# Patient Record
Sex: FEMALE | Race: ASIAN | NOT HISPANIC OR LATINO | Employment: FULL TIME | URBAN - METROPOLITAN AREA
[De-identification: names, ages, dates, MRNs, and addresses within clinical notes are randomized per-mention and may not be internally consistent; named-entity substitution may affect disease eponyms.]

---

## 2024-05-19 ENCOUNTER — APPOINTMENT (OUTPATIENT)
Dept: GENERAL RADIOLOGY | Facility: HOSPITAL | Age: 64
End: 2024-05-19
Payer: COMMERCIAL

## 2024-05-19 ENCOUNTER — HOSPITAL ENCOUNTER (EMERGENCY)
Facility: HOSPITAL | Age: 64
Discharge: HOME | End: 2024-05-20
Attending: EMERGENCY MEDICINE | Admitting: EMERGENCY MEDICINE
Payer: COMMERCIAL

## 2024-05-19 DIAGNOSIS — M25.562 PAIN AND SWELLING OF KNEE, LEFT: ICD-10-CM

## 2024-05-19 DIAGNOSIS — M25.462 PAIN AND SWELLING OF KNEE, LEFT: ICD-10-CM

## 2024-05-19 DIAGNOSIS — R21 RASH AND OTHER NONSPECIFIC SKIN ERUPTION: Primary | ICD-10-CM

## 2024-05-19 LAB
ALBUMIN SERPL-MCNC: 4.4 G/DL (ref 3.5–5.2)
ALBUMIN/GLOB SERPL: 1.4 G/DL
ALP SERPL-CCNC: 87 U/L (ref 39–117)
ALT SERPL W P-5'-P-CCNC: 6 U/L (ref 1–33)
ANION GAP SERPL CALCULATED.3IONS-SCNC: 11.3 MMOL/L (ref 5–15)
AST SERPL-CCNC: 20 U/L (ref 1–32)
BASOPHILS # BLD AUTO: 0.03 10*3/MM3 (ref 0–0.2)
BASOPHILS NFR BLD AUTO: 0.6 % (ref 0–1.5)
BILIRUB SERPL-MCNC: 0.4 MG/DL (ref 0–1.2)
BUN SERPL-MCNC: 9 MG/DL (ref 8–23)
BUN/CREAT SERPL: 14.1 (ref 7–25)
CALCIUM SPEC-SCNC: 9.1 MG/DL (ref 8.6–10.5)
CHLORIDE SERPL-SCNC: 100 MMOL/L (ref 98–107)
CO2 SERPL-SCNC: 28.7 MMOL/L (ref 22–29)
CREAT SERPL-MCNC: 0.64 MG/DL (ref 0.57–1)
CRP SERPL-MCNC: <0.3 MG/DL (ref 0–0.5)
DEPRECATED RDW RBC AUTO: 40.8 FL (ref 37–54)
EGFRCR SERPLBLD CKD-EPI 2021: 100.1 ML/MIN/1.73
EOSINOPHIL # BLD AUTO: 0.11 10*3/MM3 (ref 0–0.4)
EOSINOPHIL NFR BLD AUTO: 2.1 % (ref 0.3–6.2)
ERYTHROCYTE [DISTWIDTH] IN BLOOD BY AUTOMATED COUNT: 12.4 % (ref 12.3–15.4)
ERYTHROCYTE [SEDIMENTATION RATE] IN BLOOD: 16 MM/HR (ref 0–30)
GLOBULIN UR ELPH-MCNC: 3.1 GM/DL
GLUCOSE SERPL-MCNC: 124 MG/DL (ref 65–99)
HCT VFR BLD AUTO: 39.5 % (ref 34–46.6)
HGB BLD-MCNC: 13.1 G/DL (ref 12–15.9)
IMM GRANULOCYTES # BLD AUTO: 0.01 10*3/MM3 (ref 0–0.05)
IMM GRANULOCYTES NFR BLD AUTO: 0.2 % (ref 0–0.5)
LYMPHOCYTES # BLD AUTO: 1.42 10*3/MM3 (ref 0.7–3.1)
LYMPHOCYTES NFR BLD AUTO: 26.9 % (ref 19.6–45.3)
MCH RBC QN AUTO: 29.8 PG (ref 26.6–33)
MCHC RBC AUTO-ENTMCNC: 33.2 G/DL (ref 31.5–35.7)
MCV RBC AUTO: 90 FL (ref 79–97)
MONOCYTES # BLD AUTO: 0.38 10*3/MM3 (ref 0.1–0.9)
MONOCYTES NFR BLD AUTO: 7.2 % (ref 5–12)
NEUTROPHILS NFR BLD AUTO: 3.32 10*3/MM3 (ref 1.7–7)
NEUTROPHILS NFR BLD AUTO: 63 % (ref 42.7–76)
NRBC BLD AUTO-RTO: 0 /100 WBC (ref 0–0.2)
PLATELET # BLD AUTO: 256 10*3/MM3 (ref 140–450)
PMV BLD AUTO: 9 FL (ref 6–12)
POTASSIUM SERPL-SCNC: 3.3 MMOL/L (ref 3.5–5.2)
PROT SERPL-MCNC: 7.5 G/DL (ref 6–8.5)
RBC # BLD AUTO: 4.39 10*6/MM3 (ref 3.77–5.28)
SODIUM SERPL-SCNC: 140 MMOL/L (ref 136–145)
WBC NRBC COR # BLD AUTO: 5.27 10*3/MM3 (ref 3.4–10.8)

## 2024-05-19 PROCEDURE — 25010000002 DEXAMETHASONE SODIUM PHOSPHATE 10 MG/ML SOLUTION

## 2024-05-19 PROCEDURE — 86618 LYME DISEASE ANTIBODY: CPT

## 2024-05-19 PROCEDURE — 86140 C-REACTIVE PROTEIN: CPT

## 2024-05-19 PROCEDURE — 25010000002 KETOROLAC TROMETHAMINE PER 15 MG

## 2024-05-19 PROCEDURE — 85025 COMPLETE CBC W/AUTO DIFF WBC: CPT

## 2024-05-19 PROCEDURE — 99283 EMERGENCY DEPT VISIT LOW MDM: CPT

## 2024-05-19 PROCEDURE — 96375 TX/PRO/DX INJ NEW DRUG ADDON: CPT

## 2024-05-19 PROCEDURE — 96374 THER/PROPH/DIAG INJ IV PUSH: CPT

## 2024-05-19 PROCEDURE — 87484 EHRLICHA CHAFFEENSIS AMP PRB: CPT

## 2024-05-19 PROCEDURE — 87468 ANAPLSMA PHGCYTOPHLM AMP PRB: CPT

## 2024-05-19 PROCEDURE — 87798 DETECT AGENT NOS DNA AMP: CPT

## 2024-05-19 PROCEDURE — 73562 X-RAY EXAM OF KNEE 3: CPT

## 2024-05-19 PROCEDURE — 85652 RBC SED RATE AUTOMATED: CPT

## 2024-05-19 PROCEDURE — 80053 COMPREHEN METABOLIC PANEL: CPT

## 2024-05-19 RX ORDER — KETOROLAC TROMETHAMINE 30 MG/ML
30 INJECTION, SOLUTION INTRAMUSCULAR; INTRAVENOUS ONCE
Status: COMPLETED | OUTPATIENT
Start: 2024-05-19 | End: 2024-05-19

## 2024-05-19 RX ORDER — FAMOTIDINE 20 MG/1
20 TABLET, FILM COATED ORAL
Status: DISCONTINUED | OUTPATIENT
Start: 2024-05-20 | End: 2024-05-19

## 2024-05-19 RX ORDER — FAMOTIDINE 10 MG/ML
20 INJECTION, SOLUTION INTRAVENOUS ONCE
Status: COMPLETED | OUTPATIENT
Start: 2024-05-19 | End: 2024-05-19

## 2024-05-19 RX ORDER — DEXAMETHASONE SODIUM PHOSPHATE 10 MG/ML
10 INJECTION, SOLUTION INTRAMUSCULAR; INTRAVENOUS ONCE
Status: DISCONTINUED | OUTPATIENT
Start: 2024-05-19 | End: 2024-05-19

## 2024-05-19 RX ORDER — DEXAMETHASONE SODIUM PHOSPHATE 10 MG/ML
10 INJECTION, SOLUTION INTRAMUSCULAR; INTRAVENOUS ONCE
Status: COMPLETED | OUTPATIENT
Start: 2024-05-19 | End: 2024-05-19

## 2024-05-19 RX ADMIN — FAMOTIDINE 20 MG: 10 INJECTION INTRAVENOUS at 23:31

## 2024-05-19 RX ADMIN — DEXAMETHASONE SODIUM PHOSPHATE 10 MG: 10 INJECTION, SOLUTION INTRAMUSCULAR; INTRAVENOUS at 23:31

## 2024-05-19 RX ADMIN — KETOROLAC TROMETHAMINE 30 MG: 30 INJECTION, SOLUTION INTRAMUSCULAR; INTRAVENOUS at 23:31

## 2024-05-19 NOTE — Clinical Note
[facility] EMERGENCY ROOM  [address] [address]  Phone: [phone]  Fax: [phone]    Stevenson Zuleta was seen and treated in our emergency department on 5/19/2024.  She may return to work on 05/22/2024.         Thank you for choosing [facility].    Andre Griffin PA-C

## 2024-05-20 VITALS
RESPIRATION RATE: 18 BRPM | TEMPERATURE: 98.9 F | HEART RATE: 67 BPM | SYSTOLIC BLOOD PRESSURE: 144 MMHG | HEIGHT: 62 IN | OXYGEN SATURATION: 98 % | DIASTOLIC BLOOD PRESSURE: 69 MMHG

## 2024-05-20 RX ORDER — FAMOTIDINE 20 MG/1
20 TABLET, FILM COATED ORAL 2 TIMES DAILY
Qty: 20 TABLET | Refills: 0 | Status: SHIPPED | OUTPATIENT
Start: 2024-05-20

## 2024-05-20 RX ORDER — DOXYCYCLINE 100 MG/1
100 CAPSULE ORAL 2 TIMES DAILY
Qty: 20 CAPSULE | Refills: 0 | Status: SHIPPED | OUTPATIENT
Start: 2024-05-20 | End: 2024-05-30

## 2024-05-20 NOTE — DISCHARGE INSTRUCTIONS
All lab work negative  White blood cell count is not elevated, your inflammatory markers are not elevated.  Your x-ray was negative for for inflammation or fluid  Please take Benadryl at night to make sure drowsy, have sent Pepcid to help as a antihistamine   I have started you on antibiotics.  Please take twice daily for 10 days  Please open your MyChart for your tickborne panel results and follow-up with your PCP.  I have also put in referral for infectious disease.

## 2024-05-20 NOTE — ED PROVIDER NOTES
"Time: 11:10 PM EDT  Date of encounter:  5/19/2024  Independent Historian/Clinical History and Information was obtained by:   Patient    History is limited by: N/A    Chief Complaint   Patient presents with    Rash         History of Present Illness:  Patient is a 63 y.o. year old female who presents to the emergency department for evaluation of rash to bilateral lower extremities from anterior thigh down to foot x 5 days.  Patient states that she was in [place] wearing socks, long pants and boots walking in grass when she got bit by some insects.  Patient states that she did not notice the rash until 3 days after.  Patient also has complaints of left knee pain, swelling and erythema that started today and states that she was bit by an insect there as well for.  Denies fall or injury.  States the rash itches.  Not able to take Benadryl due to being allergic to it.     Patient Care Team  Primary Care Provider: Provider, No Known    Past Medical History:     No Known Allergies  History reviewed. No pertinent past medical history.  No past surgical history on file.  History reviewed. No pertinent family history.    Home Medications:  Prior to Admission medications    Not on File        Social History:          Review of Systems:  Review of Systems   Constitutional: Negative.    HENT: Negative.     Eyes: Negative.    Respiratory: Negative.     Cardiovascular: Negative.    Gastrointestinal: Negative.    Endocrine: Negative.    Genitourinary: Negative.    Musculoskeletal:  Positive for arthralgias and joint swelling.   Skin:  Positive for color change and wound.   Allergic/Immunologic: Negative.    Neurological: Negative.    Hematological: Negative.    Psychiatric/Behavioral: Negative.          Physical Exam:  /69 (BP Location: Right arm, Patient Position: Sitting)   Pulse 72   Temp 98.9 °F (37.2 °C) (Oral)   Resp 18   Ht 157.5 cm (62\")   SpO2 97%         Physical Exam  Vitals and nursing note reviewed. "   Constitutional:       Appearance: Normal appearance.   HENT:      Head: Normocephalic and atraumatic.      Nose: Nose normal.      Mouth/Throat:      Mouth: Mucous membranes are moist.   Eyes:      Extraocular Movements: Extraocular movements intact.      Conjunctiva/sclera: Conjunctivae normal.      Pupils: Pupils are equal, round, and reactive to light.   Cardiovascular:      Rate and Rhythm: Normal rate and regular rhythm.      Heart sounds: Normal heart sounds.   Pulmonary:      Effort: Pulmonary effort is normal.      Breath sounds: Normal breath sounds.   Musculoskeletal:         General: Swelling and tenderness present. Normal range of motion.      Cervical back: Normal range of motion and neck supple.      Left knee: Swelling and erythema present. Normal range of motion. Tenderness present.        Legs:    Skin:     General: Skin is warm and dry.      Findings: Erythema and rash present. Rash is macular and papular. Rash is not pustular or urticarial.      Comments: There is several areas of nonblanchable erythematous less than 1 cm in length and greater than 1 cm circular rash noted from the upper leg worsening towards the lower legs.  Rash at the lower extremities are raised.     See pictures attached   Neurological:      General: No focal deficit present.      Mental Status: She is alert and oriented to person, place, and time.   Psychiatric:         Mood and Affect: Mood normal.         Behavior: Behavior normal.                                Procedures:  Procedures      Medical Decision Making:      Comorbidities that affect care:    None    External Notes reviewed:    None      The following orders were placed and all results were independently analyzed by me:  Orders Placed This Encounter   Procedures    XR Knee 3 View Left    Comprehensive Metabolic Panel    Sedimentation Rate    C-reactive Protein    Ehrlichia Profile DNA PCR    Rickettsia Species DNA, Real-Time PCR    Lyme Disease Total Antibody  With Reflex to Immunoassay    CBC Auto Differential    Ambulatory Referral to Infectious Disease    CBC & Differential       Medications Given in the Emergency Department:  Medications   ketorolac (TORADOL) injection 30 mg (30 mg Intravenous Given 5/19/24 2331)   dexAMETHasone sodium phosphate injection 10 mg (10 mg Intravenous Given 5/19/24 2331)   famotidine (PEPCID) injection 20 mg (20 mg Intravenous Given 5/19/24 2331)        ED Course:    The patient was initially evaluated in the triage area where orders were placed. The patient was later dispositioned by Andre Griffin PA-C.      The patient was advised to stay for completion of workup which includes but is not limited to communication of labs and radiological results, reassessment and plan. The patient was advised that leaving prior to disposition by a provider could result in critical findings that are not communicated to the patient.     ED Course as of 05/20/24 0017   Sun May 19, 2024   2334 Patient states she is not allergic to Benadryl which is makes her sleepy.  Patient does not have another ride home. [AJ]      ED Course User Index  [AJ] Andre Griffin PA-C       Labs:    Lab Results (last 24 hours)       Procedure Component Value Units Date/Time    CBC & Differential [[id number]]  (Normal) Collected: 05/19/24 2327    Specimen: Blood from Arm, Right Updated: 05/19/24 2333    Narrative:      The following orders were created for panel order CBC & Differential.  Procedure                               Abnormality         Status                     ---------                               -----------         ------                     CBC Auto Differential[[id number]]        Normal              Final result                 Please view results for these tests on the individual orders.    Comprehensive Metabolic Panel [[id number]]  (Abnormal) Collected: 05/19/24 2327    Specimen: Blood from Arm, Right Updated: 05/19/24 2354     Glucose 124 mg/dL       BUN 9 mg/dL      Creatinine 0.64 mg/dL      Sodium 140 mmol/L      Potassium 3.3 mmol/L      Chloride 100 mmol/L      CO2 28.7 mmol/L      Calcium 9.1 mg/dL      Total Protein 7.5 g/dL      Albumin 4.4 g/dL      ALT (SGPT) 6 U/L      AST (SGOT) 20 U/L      Alkaline Phosphatase 87 U/L      Total Bilirubin 0.4 mg/dL      Globulin 3.1 gm/dL      A/G Ratio 1.4 g/dL      BUN/Creatinine Ratio 14.1     Anion Gap 11.3 mmol/L      eGFR 100.1 mL/min/1.73     Narrative:      GFR Normal >60  Chronic Kidney Disease <60  Kidney Failure <15      Sedimentation Rate [[id number]]  (Normal) Collected: 05/19/24 2327    Specimen: Blood from Arm, Right Updated: 05/19/24 2337     Sed Rate 16 mm/hr     C-reactive Protein [[id number]]  (Normal) Collected: 05/19/24 2327    Specimen: Blood from Arm, Right Updated: 05/19/24 2354     C-Reactive Protein <0.30 mg/dL     Ehrlichia Profile DNA PCR [[id number]] Collected: 05/19/24 2327    Specimen: Blood from Arm, Right Updated: 05/19/24 2330    Rickettsia Species DNA, Real-Time PCR [[id number]] Collected: 05/19/24 2327    Specimen: Blood from Arm, Right Updated: 05/19/24 2330    Lyme Disease Total Antibody With Reflex to Immunoassay [[id number]] Collected: 05/19/24 2327    Specimen: Blood from Arm, Right Updated: 05/19/24 2330    CBC Auto Differential [[id number]]  (Normal) Collected: 05/19/24 2327    Specimen: Blood from Arm, Right Updated: 05/19/24 2333     WBC 5.27 10*3/mm3      RBC 4.39 10*6/mm3      Hemoglobin 13.1 g/dL      Hematocrit 39.5 %      MCV 90.0 fL      MCH 29.8 pg      MCHC 33.2 g/dL      RDW 12.4 %      RDW-SD 40.8 fl      MPV 9.0 fL      Platelets 256 10*3/mm3      Neutrophil % 63.0 %      Lymphocyte % 26.9 %      Monocyte % 7.2 %      Eosinophil % 2.1 %      Basophil % 0.6 %      Immature Grans % 0.2 %      Neutrophils, Absolute 3.32 10*3/mm3      Lymphocytes, Absolute 1.42 10*3/mm3      Monocytes, Absolute 0.38 10*3/mm3      Eosinophils, Absolute 0.11 10*3/mm3      Basophils,  Absolute 0.03 10*3/mm3      Immature Grans, Absolute 0.01 10*3/mm3      nRBC 0.0 /100 WBC              Imaging:    XR Knee 3 View Left    Result Date: 5/20/2024  XR KNEE 3 VW LEFT-  Date of Exam: 5/19/2024 11:33 PM  Indication: Left knee pain/erythema/swelling.  Comparison: None available.  FINDINGS: 3 nonweightbearing views of the left knee were obtained. No acute fracture or acute malalignment is identified. No retained radiopaque foreign body is seen. No joint effusion. The joint spaces are preserved radiographically with minimal, if any, degenerative change. Mild nonspecific soft tissue swelling may be present anteromedially. No ectopic gas collection is suggested. If symptoms or clinical concerns persist, consider imaging follow-up.  CONCLUSION: No acute fracture or acute malalignment is identified.    Please note that portions of this note were completed with a voice recognition program.       Electronically Signed By-Elías Denson MD On:5/20/2024 12:08 AM         Differential Diagnosis and Discussion:      Joint Pain: Differential diagnosis includes but is not limited to polyarticular arthritis, gout, tendinitis, hemarthrosis, septic arthritis, rheumatoid arthritis, bursitis, degenerative joint disease, joint effusion, autoimmune disorder, trauma, and occult neoplasm.  Rash: Differential diagnosis includes but is not limited to sepsis, cellulitis, [place] Spotted Fever, meningitis, meningococcemia, Varicella, Strep infection, dermatitis, allergic reaction, Lyme disease, and toxic shock syndrome.    All labs were reviewed and interpreted by me.  All X-rays impressions were independently interpreted by me.    MDM     Amount and/or Complexity of Data Reviewed  Clinical lab tests: reviewed                 Patient Care Considerations:    SEPSIS was considered but is NOT present in the emergency department as SIRS criteria is not present.      Consultants/Shared Management Plan:    None    Social  Determinants of Health:    Patient is independent, reliable, and has access to care.       Disposition and Care Coordination:    Discharged: The patient is suitable and stable for discharge with no need for consideration of admission.    I have explained the patient´s condition, diagnoses and treatment plan based on the information available to me at this time. I have answered questions and addressed any concerns. The patient has a good  understanding of the patient´s diagnosis, condition, and treatment plan as can be expected at this point. The vital signs have been stable. The patient´s condition is stable and appropriate for discharge from the emergency department.      The patient will pursue further outpatient evaluation with the primary care physician or other designated or consulting physician as outlined in the discharge instructions. They are agreeable to this plan of care and follow-up instructions have been explained in detail. The patient has received these instructions in written format and has expressed an understanding of the discharge instructions. The patient is aware that any significant change in condition or worsening of symptoms should prompt an immediate return to this or the closest emergency department or call to 911.  I have explained discharge medications and the need for follow up with the patient/caretakers. This was also printed in the discharge instructions. Patient was discharged with the following medications and follow up:      Medication List        New Prescriptions      doxycycline 100 MG capsule  Commonly known as: MONODOX  Take 1 capsule by mouth 2 (Two) Times a Day for 10 days.     famotidine 20 MG tablet  Commonly known as: PEPCID  Take 1 tablet by mouth 2 (Two) Times a Day.               Where to Get Your Medications        These medications were sent to [facility]/pharmacy #[id number] - [place], [place] - [address] - [phone]  - [phone] FX  [address]  [place] [address]      Hours: 24-hours Phone: [phone]   doxycycline 100 MG capsule  famotidine 20 MG tablet      Provider, No Known  [facility]  [place] [address]             Final diagnoses:   Rash and other nonspecific skin eruption   Pain and swelling of knee, left        ED Disposition       ED Disposition   Discharge    Condition   Stable    Comment   --               This medical record created using voice recognition software.             Andre Griffin PA-C  05/20/24 0016       Andre Griffin PA-C  05/20/24 0017

## 2024-05-21 LAB — B BURGDOR IGG+IGM SER QL IA: NEGATIVE

## 2024-05-23 LAB
A PHAGOCYTOPH DNA BLD QL NAA+PROBE: NEGATIVE
E CHAFFEENSIS DNA BLD QL NAA+PROBE: NEGATIVE

## 2024-05-24 ENCOUNTER — HOSPITAL ENCOUNTER (EMERGENCY)
Facility: HOSPITAL | Age: 64
Discharge: HOME | End: 2024-05-24
Attending: EMERGENCY MEDICINE
Payer: COMMERCIAL

## 2024-05-24 ENCOUNTER — APPOINTMENT (OUTPATIENT)
Dept: GENERAL RADIOLOGY | Facility: HOSPITAL | Age: 64
End: 2024-05-24
Payer: COMMERCIAL

## 2024-05-24 VITALS
RESPIRATION RATE: 18 BRPM | HEIGHT: 62 IN | WEIGHT: 102.29 LBS | SYSTOLIC BLOOD PRESSURE: 139 MMHG | HEART RATE: 74 BPM | OXYGEN SATURATION: 99 % | DIASTOLIC BLOOD PRESSURE: 63 MMHG | BODY MASS INDEX: 18.82 KG/M2 | TEMPERATURE: 97.9 F

## 2024-05-24 DIAGNOSIS — L24.9 IRRITANT CONTACT DERMATITIS, UNSPECIFIED TRIGGER: Primary | ICD-10-CM

## 2024-05-24 LAB
ALBUMIN SERPL-MCNC: 4.4 G/DL (ref 3.5–5.2)
ALBUMIN/GLOB SERPL: 1.4 G/DL
ALP SERPL-CCNC: 91 U/L (ref 39–117)
ALT SERPL W P-5'-P-CCNC: 13 U/L (ref 1–33)
ANION GAP SERPL CALCULATED.3IONS-SCNC: 9.7 MMOL/L (ref 5–15)
AST SERPL-CCNC: 22 U/L (ref 1–32)
BASOPHILS # BLD AUTO: 0.03 10*3/MM3 (ref 0–0.2)
BASOPHILS NFR BLD AUTO: 0.4 % (ref 0–1.5)
BILIRUB SERPL-MCNC: 0.3 MG/DL (ref 0–1.2)
BUN SERPL-MCNC: 10 MG/DL (ref 8–23)
BUN/CREAT SERPL: 13.9 (ref 7–25)
CALCIUM SPEC-SCNC: 8.9 MG/DL (ref 8.6–10.5)
CHLORIDE SERPL-SCNC: 102 MMOL/L (ref 98–107)
CO2 SERPL-SCNC: 28.3 MMOL/L (ref 22–29)
CREAT SERPL-MCNC: 0.72 MG/DL (ref 0.57–1)
DEPRECATED RDW RBC AUTO: 40.7 FL (ref 37–54)
EGFRCR SERPLBLD CKD-EPI 2021: 94.7 ML/MIN/1.73
EOSINOPHIL # BLD AUTO: 0.14 10*3/MM3 (ref 0–0.4)
EOSINOPHIL NFR BLD AUTO: 1.9 % (ref 0.3–6.2)
ERYTHROCYTE [DISTWIDTH] IN BLOOD BY AUTOMATED COUNT: 12.4 % (ref 12.3–15.4)
GLOBULIN UR ELPH-MCNC: 3.1 GM/DL
GLUCOSE SERPL-MCNC: 85 MG/DL (ref 65–99)
HCT VFR BLD AUTO: 38.3 % (ref 34–46.6)
HGB BLD-MCNC: 12.6 G/DL (ref 12–15.9)
IMM GRANULOCYTES # BLD AUTO: 0.02 10*3/MM3 (ref 0–0.05)
IMM GRANULOCYTES NFR BLD AUTO: 0.3 % (ref 0–0.5)
LYMPHOCYTES # BLD AUTO: 1.64 10*3/MM3 (ref 0.7–3.1)
LYMPHOCYTES NFR BLD AUTO: 22.7 % (ref 19.6–45.3)
MCH RBC QN AUTO: 29.6 PG (ref 26.6–33)
MCHC RBC AUTO-ENTMCNC: 32.9 G/DL (ref 31.5–35.7)
MCV RBC AUTO: 90.1 FL (ref 79–97)
MONOCYTES # BLD AUTO: 0.56 10*3/MM3 (ref 0.1–0.9)
MONOCYTES NFR BLD AUTO: 7.8 % (ref 5–12)
NEUTROPHILS NFR BLD AUTO: 4.83 10*3/MM3 (ref 1.7–7)
NEUTROPHILS NFR BLD AUTO: 66.9 % (ref 42.7–76)
NRBC BLD AUTO-RTO: 0 /100 WBC (ref 0–0.2)
PLATELET # BLD AUTO: 263 10*3/MM3 (ref 140–450)
PMV BLD AUTO: 9.3 FL (ref 6–12)
POTASSIUM SERPL-SCNC: 3.8 MMOL/L (ref 3.5–5.2)
PROT SERPL-MCNC: 7.5 G/DL (ref 6–8.5)
RBC # BLD AUTO: 4.25 10*6/MM3 (ref 3.77–5.28)
RICKETTSIA RICKETTSII DNA, RT: NOT DETECTED
SODIUM SERPL-SCNC: 140 MMOL/L (ref 136–145)
WBC NRBC COR # BLD AUTO: 7.22 10*3/MM3 (ref 3.4–10.8)

## 2024-05-24 PROCEDURE — 85025 COMPLETE CBC W/AUTO DIFF WBC: CPT

## 2024-05-24 PROCEDURE — 99283 EMERGENCY DEPT VISIT LOW MDM: CPT

## 2024-05-24 PROCEDURE — 80053 COMPREHEN METABOLIC PANEL: CPT

## 2024-05-24 PROCEDURE — 73610 X-RAY EXAM OF ANKLE: CPT

## 2024-05-24 PROCEDURE — 87476 LYME DIS DNA AMP PROBE: CPT

## 2024-05-24 PROCEDURE — 86666 EHRLICHIA ANTIBODY: CPT

## 2024-05-24 RX ORDER — CLOBETASOL PROPIONATE 0.5 MG/G
1 CREAM TOPICAL 2 TIMES DAILY
Qty: 14 G | Refills: 0 | Status: SHIPPED | OUTPATIENT
Start: 2024-05-24 | End: 2024-05-31

## 2024-05-24 NOTE — ED PROVIDER NOTES
Time: 2:00 PM EDT  Date of encounter:  5/24/2024  Independent Historian/Clinical History and Information was obtained by:   Patient    History is limited by: N/A    Chief Complaint: Insect bite      History of Present Illness:  Patient is a 63 y.o. year old female who presents to the emergency department for evaluation of insect bite that occurred 1 week ago while walking in the woods to right lower extremity.  Patient came to the ED 5 days ago and states she was given an antibiotic.  Patient states the ankle is still red.  Patient denies fever.  Patient states she is able to ambulate.  Patient denies calf pain.  Patient denies numbness/tingling in toes.    HPI    Patient Care Team  Primary Care Provider: Provider, No Known    Past Medical History:     No Known Allergies  History reviewed. No pertinent past medical history.  History reviewed. No pertinent surgical history.  History reviewed. No pertinent family history.    Home Medications:  Prior to Admission medications    Medication Sig Start Date End Date Taking? Authorizing Provider   doxycycline (MONODOX) 100 MG capsule Take 1 capsule by mouth 2 (Two) Times a Day for 10 days. 5/20/24 5/30/24  Andre Griffin PA-C   famotidine (PEPCID) 20 MG tablet Take 1 tablet by mouth 2 (Two) Times a Day. 5/20/24   Andre Griffin PA-C        Social History:   Social History     Tobacco Use    Smoking status: Never   Substance Use Topics    Alcohol use: Yes     Comment: occasional wine    Drug use: Never         Review of Systems:  Review of Systems   Constitutional:  Negative for chills and fever.   HENT:  Negative for congestion, rhinorrhea and sore throat.    Eyes:  Negative for pain and visual disturbance.   Respiratory:  Negative for apnea, cough, chest tightness and shortness of breath.    Cardiovascular:  Negative for chest pain and palpitations.   Gastrointestinal:  Negative for abdominal pain, diarrhea, nausea and vomiting.   Genitourinary:  Negative for  "difficulty urinating and dysuria.   Musculoskeletal:  Positive for arthralgias. Negative for joint swelling and myalgias.   Skin:  Positive for color change.   Neurological:  Negative for seizures and headaches.   Psychiatric/Behavioral: Negative.     All other systems reviewed and are negative.       Physical Exam:  /63   Pulse 74   Temp 97.9 °F (36.6 °C) (Oral)   Resp 18   Ht 157.5 cm (62\")   Wt 46.4 kg (102 lb 4.7 oz)   SpO2 99%   BMI 18.71 kg/m²     Physical Exam  Vitals and nursing note reviewed.   Constitutional:       General: She is not in acute distress.     Appearance: Normal appearance. She is not toxic-appearing.   HENT:      Head: Normocephalic and atraumatic.      Jaw: There is normal jaw occlusion.   Eyes:      General: Lids are normal.      Extraocular Movements: Extraocular movements intact.      Conjunctiva/sclera: Conjunctivae normal.      Pupils: Pupils are equal, round, and reactive to light.   Cardiovascular:      Rate and Rhythm: Normal rate and regular rhythm.      Pulses: Normal pulses.      Heart sounds: Normal heart sounds.   Pulmonary:      Effort: Pulmonary effort is normal. No respiratory distress.      Breath sounds: Normal breath sounds. No wheezing or rhonchi.   Abdominal:      General: Abdomen is flat.      Palpations: Abdomen is soft.      Tenderness: There is no abdominal tenderness. There is no guarding or rebound.   Musculoskeletal:         General: Normal range of motion.      Cervical back: Normal range of motion and neck supple.      Right lower leg: No edema.      Left lower leg: No edema.   Skin:     General: Skin is warm and dry.      Findings: Erythema (Present to right ankle.  Picture attached below.  Capillary refill less than 2 seconds.  Pedal pulse 2+) present.   Neurological:      Mental Status: She is alert and oriented to person, place, and time. Mental status is at baseline.   Psychiatric:         Mood and Affect: Mood normal.            "       Procedures:  Procedures      Medical Decision Making:      Comorbidities that affect care:    None    External Notes reviewed:          The following orders were placed and all results were independently analyzed by me:  Orders Placed This Encounter   Procedures    Lyme Disease, PCR - Blood, Arm, Left    XR Ankle 3+ View Right    Comprehensive Metabolic Panel    Human Gran. Ehrlichiosis (IgG)    CBC Auto Differential    CBC & Differential       Medications Given in the Emergency Department:  Medications - No data to display     ED Course:         Labs:    Lab Results (last 24 hours)       Procedure Component Value Units Date/Time    CBC & Differential [[id number]]  (Normal) Collected: 05/24/24 1509    Specimen: Blood Updated: 05/24/24 1518    Narrative:      The following orders were created for panel order CBC & Differential.  Procedure                               Abnormality         Status                     ---------                               -----------         ------                     CBC Auto Differential[[id number]]        Normal              Final result                 Please view results for these tests on the individual orders.    Comprehensive Metabolic Panel [[id number]] Collected: 05/24/24 1509    Specimen: Blood Updated: 05/24/24 1542     Glucose 85 mg/dL      BUN 10 mg/dL      Creatinine 0.72 mg/dL      Sodium 140 mmol/L      Potassium 3.8 mmol/L      Chloride 102 mmol/L      CO2 28.3 mmol/L      Calcium 8.9 mg/dL      Total Protein 7.5 g/dL      Albumin 4.4 g/dL      ALT (SGPT) 13 U/L      AST (SGOT) 22 U/L      Alkaline Phosphatase 91 U/L      Total Bilirubin 0.3 mg/dL      Globulin 3.1 gm/dL      A/G Ratio 1.4 g/dL      BUN/Creatinine Ratio 13.9     Anion Gap 9.7 mmol/L      eGFR 94.7 mL/min/1.73     Narrative:      GFR Normal >60  Chronic Kidney Disease <60  Kidney Failure <15      Human Gran. Ehrlichiosis (IgG) [[id number]] Collected: 05/24/24 1509    Specimen: Blood Updated: 05/24/24  1514    Lyme Disease, PCR - Blood, Arm, Left [[id number]] Collected: 05/24/24 1509    Specimen: Blood from Arm, Left Updated: 05/24/24 1514    CBC Auto Differential [[id number]]  (Normal) Collected: 05/24/24 1509    Specimen: Blood Updated: 05/24/24 1518     WBC 7.22 10*3/mm3      RBC 4.25 10*6/mm3      Hemoglobin 12.6 g/dL      Hematocrit 38.3 %      MCV 90.1 fL      MCH 29.6 pg      MCHC 32.9 g/dL      RDW 12.4 %      RDW-SD 40.7 fl      MPV 9.3 fL      Platelets 263 10*3/mm3      Neutrophil % 66.9 %      Lymphocyte % 22.7 %      Monocyte % 7.8 %      Eosinophil % 1.9 %      Basophil % 0.4 %      Immature Grans % 0.3 %      Neutrophils, Absolute 4.83 10*3/mm3      Lymphocytes, Absolute 1.64 10*3/mm3      Monocytes, Absolute 0.56 10*3/mm3      Eosinophils, Absolute 0.14 10*3/mm3      Basophils, Absolute 0.03 10*3/mm3      Immature Grans, Absolute 0.02 10*3/mm3      nRBC 0.0 /100 WBC              Imaging:    XR Ankle 3+ View Right    Result Date: 5/24/2024  XR ANKLE 3+ VW RIGHT-  Date of Exam: 5/24/2024 2:42 PM  Indication: swelling,pain  Comparison: None available.  Findings: There is no acute fracture or dislocation. No bony erosion or abnormal periosteal reaction. The ankle mortise appears intact. Soft tissues are unremarkable.      Impression:  1. No acute bony abnormality   Electronically Signed By-Roc Cifuentes MD On:5/24/2024 3:43 PM         Differential Diagnosis and Discussion:    Rash: Differential diagnosis includes but is not limited to sepsis, cellulitis, [place] Spotted Fever, meningitis, meningococcemia, Varicella, Strep infection, dermatitis, allergic reaction, Lyme disease, and toxic shock syndrome.    All labs were reviewed and interpreted by me.  All X-rays impressions were independently interpreted by me.    MDM     The patient´s CBC that was reviewed and interpreted by me shows no abnormalities of critical concern. Of note, there is no anemia requiring a blood transfusion and the platelet  count is acceptable.  The patient´s CMP that was reviewed and interpretted by me shows no abnormalities of critical concern. Of note, the patient´s sodium and potassium are acceptable. The patient´s liver enzymes are unremarkable. The patient´s renal function (creatinine) is preserved. The patient has a normal anion gap.  X-ray shows no bony abnormality.  I instructed patient to finish out her prescription of doxycycline.  I will send patient home with steroid cream.  Instructed patient to follow-up with PCP this week.  Instructed patient to return to ED in the meantime she develops any new or worsening symptoms.  Dorsalis pedal pulse 2+.  Capillary refill less than 2 seconds.  Patient states she understands and agrees with plan of care.          Patient Care Considerations:          Consultants/Shared Management Plan:    None    Social Determinants of Health:    Patient is independent, reliable, and has access to care.       Disposition and Care Coordination:    Discharged: The patient is suitable and stable for discharge with no need for consideration of admission.    I have explained the patient´s condition, diagnoses and treatment plan based on the information available to me at this time. I have answered questions and addressed any concerns. The patient has a good  understanding of the patient´s diagnosis, condition, and treatment plan as can be expected at this point. The vital signs have been stable. The patient´s condition is stable and appropriate for discharge from the emergency department.      The patient will pursue further outpatient evaluation with the primary care physician or other designated or consulting physician as outlined in the discharge instructions. They are agreeable to this plan of care and follow-up instructions have been explained in detail. The patient has received these instructions in written format and has expressed an understanding of the discharge instructions. The patient is  aware that any significant change in condition or worsening of symptoms should prompt an immediate return to this or the closest emergency department or call to 911.  I have explained discharge medications and the need for follow up with the patient/caretakers. This was also printed in the discharge instructions. Patient was discharged with the following medications and follow up:      Medication List        New Prescriptions      clobetasol propionate 0.05 % cream  Commonly known as: TEMOVATE  Apply 1 Application topically to the appropriate area as directed 2 (Two) Times a Day for 7 days.               Where to Get Your Medications        These medications were sent to [facility]/pharmacy #[id number] - [place], [place] - [address] - [phone] [facility] [phone]   [address] [place] [address]      Hours: 24-hours Phone: [phone]   clobetasol propionate 0.05 % cream      Provider, No Known  [facility]  [place] [address]    Call in 1 day  To schedule follow-up appointment       Final diagnoses:   Irritant contact dermatitis, unspecified trigger        ED Disposition       ED Disposition   Discharge    Condition   Stable    Comment   --               This medical record created using voice recognition software.             Eddie Stauffer PA-C  05/24/24 0485

## 2024-05-28 ENCOUNTER — HOSPITAL ENCOUNTER (OUTPATIENT)
Facility: HOSPITAL | Age: 64
Setting detail: OBSERVATION
Discharge: HOME | End: 2024-05-29
Attending: EMERGENCY MEDICINE | Admitting: FAMILY MEDICINE
Payer: COMMERCIAL

## 2024-05-28 ENCOUNTER — APPOINTMENT (OUTPATIENT)
Dept: GENERAL RADIOLOGY | Facility: HOSPITAL | Age: 64
End: 2024-05-28
Payer: COMMERCIAL

## 2024-05-28 DIAGNOSIS — R21 RASH IN ADULT: Primary | ICD-10-CM

## 2024-05-28 DIAGNOSIS — M25.40 PAINFUL SWELLING OF JOINT: ICD-10-CM

## 2024-05-28 DIAGNOSIS — M25.572 BILATERAL ANKLE JOINT PAIN: ICD-10-CM

## 2024-05-28 DIAGNOSIS — M25.571 BILATERAL ANKLE JOINT PAIN: ICD-10-CM

## 2024-05-28 PROCEDURE — 73080 X-RAY EXAM OF ELBOW: CPT

## 2024-05-28 PROCEDURE — 99285 EMERGENCY DEPT VISIT HI MDM: CPT

## 2024-05-28 PROCEDURE — 73110 X-RAY EXAM OF WRIST: CPT

## 2024-05-29 ENCOUNTER — READMISSION MANAGEMENT (OUTPATIENT)
Dept: CALL CENTER | Facility: HOSPITAL | Age: 64
End: 2024-05-29
Payer: COMMERCIAL

## 2024-05-29 VITALS
WEIGHT: 99.65 LBS | RESPIRATION RATE: 18 BRPM | TEMPERATURE: 97.6 F | SYSTOLIC BLOOD PRESSURE: 134 MMHG | HEART RATE: 60 BPM | OXYGEN SATURATION: 100 % | HEIGHT: 62 IN | BODY MASS INDEX: 18.34 KG/M2 | DIASTOLIC BLOOD PRESSURE: 66 MMHG

## 2024-05-29 PROBLEM — M13.0 POLYARTHRITIS: Status: ACTIVE | Noted: 2024-05-29

## 2024-05-29 PROBLEM — M25.40 PAINFUL SWELLING OF JOINT: Status: ACTIVE | Noted: 2024-05-29

## 2024-05-29 PROBLEM — M25.572 ACUTE BILATERAL ANKLE PAIN: Status: ACTIVE | Noted: 2024-05-29

## 2024-05-29 PROBLEM — R21 RASH IN ADULT: Status: ACTIVE | Noted: 2024-05-29

## 2024-05-29 PROBLEM — M25.571 ACUTE BILATERAL ANKLE PAIN: Status: ACTIVE | Noted: 2024-05-29

## 2024-05-29 LAB
A PHAGOCYTOPH IGG TITR SER IF: NEGATIVE {TITER}
ALBUMIN SERPL-MCNC: 3.9 G/DL (ref 3.5–5.2)
ALBUMIN/GLOB SERPL: 1.3 G/DL
ALP SERPL-CCNC: 80 U/L (ref 39–117)
ALT SERPL W P-5'-P-CCNC: 8 U/L (ref 1–33)
ANION GAP SERPL CALCULATED.3IONS-SCNC: 10.4 MMOL/L (ref 5–15)
AST SERPL-CCNC: 16 U/L (ref 1–32)
BACTERIA UR QL AUTO: ABNORMAL /HPF
BASOPHILS # BLD AUTO: 0.04 10*3/MM3 (ref 0–0.2)
BASOPHILS NFR BLD AUTO: 0.6 % (ref 0–1.5)
BILIRUB SERPL-MCNC: 0.3 MG/DL (ref 0–1.2)
BILIRUB UR QL STRIP: NEGATIVE
BUN SERPL-MCNC: 16 MG/DL (ref 8–23)
BUN/CREAT SERPL: 21.3 (ref 7–25)
CALCIUM SPEC-SCNC: 8.8 MG/DL (ref 8.6–10.5)
CHLORIDE SERPL-SCNC: 103 MMOL/L (ref 98–107)
CHROMATIN AB SERPL-ACNC: <10 IU/ML (ref 0–14)
CLARITY UR: CLEAR
CO2 SERPL-SCNC: 25.6 MMOL/L (ref 22–29)
COLOR UR: YELLOW
CREAT SERPL-MCNC: 0.75 MG/DL (ref 0.57–1)
CRP SERPL-MCNC: 0.32 MG/DL (ref 0–0.5)
D-LACTATE SERPL-SCNC: 0.9 MMOL/L (ref 0.5–2)
DEPRECATED RDW RBC AUTO: 38.7 FL (ref 37–54)
EGFRCR SERPLBLD CKD-EPI 2021: 90.1 ML/MIN/1.73
EOSINOPHIL # BLD AUTO: 0.17 10*3/MM3 (ref 0–0.4)
EOSINOPHIL NFR BLD AUTO: 2.5 % (ref 0.3–6.2)
ERYTHROCYTE [DISTWIDTH] IN BLOOD BY AUTOMATED COUNT: 11.9 % (ref 12.3–15.4)
ERYTHROCYTE [SEDIMENTATION RATE] IN BLOOD: 31 MM/HR (ref 0–30)
GLOBULIN UR ELPH-MCNC: 3.1 GM/DL
GLUCOSE SERPL-MCNC: 106 MG/DL (ref 65–99)
GLUCOSE UR STRIP-MCNC: NEGATIVE MG/DL
HAV IGM SERPL QL IA: NORMAL
HBV CORE IGM SERPL QL IA: NORMAL
HBV SURFACE AG SERPL QL IA: NORMAL
HCT VFR BLD AUTO: 35.6 % (ref 34–46.6)
HCV AB SER QL: NORMAL
HGB BLD-MCNC: 11.8 G/DL (ref 12–15.9)
HGB UR QL STRIP.AUTO: NEGATIVE
HIV 1+2 AB+HIV1P24 AG SERPLBLD IA.RAPID: NORMAL
HIV 1+2 AB+HIV1P24 AG SERPLBLD IA.RAPID: NORMAL
HOLD SPECIMEN: NORMAL
HOLD SPECIMEN: NORMAL
HYALINE CASTS UR QL AUTO: ABNORMAL /LPF
IMM GRANULOCYTES # BLD AUTO: 0.01 10*3/MM3 (ref 0–0.05)
IMM GRANULOCYTES NFR BLD AUTO: 0.1 % (ref 0–0.5)
INR PPP: 1 (ref 0.86–1.15)
KETONES UR QL STRIP: NEGATIVE
LEUKOCYTE ESTERASE UR QL STRIP.AUTO: ABNORMAL
LYMPHOCYTES # BLD AUTO: 2.04 10*3/MM3 (ref 0.7–3.1)
LYMPHOCYTES NFR BLD AUTO: 29.4 % (ref 19.6–45.3)
MCH RBC QN AUTO: 29.3 PG (ref 26.6–33)
MCHC RBC AUTO-ENTMCNC: 33.1 G/DL (ref 31.5–35.7)
MCV RBC AUTO: 88.3 FL (ref 79–97)
MONOCYTES # BLD AUTO: 0.66 10*3/MM3 (ref 0.1–0.9)
MONOCYTES NFR BLD AUTO: 9.5 % (ref 5–12)
NEUTROPHILS NFR BLD AUTO: 4.01 10*3/MM3 (ref 1.7–7)
NEUTROPHILS NFR BLD AUTO: 57.9 % (ref 42.7–76)
NITRITE UR QL STRIP: NEGATIVE
NRBC BLD AUTO-RTO: 0 /100 WBC (ref 0–0.2)
PH UR STRIP.AUTO: 7.5 [PH] (ref 5–8)
PLATELET # BLD AUTO: 290 10*3/MM3 (ref 140–450)
PMV BLD AUTO: 9.3 FL (ref 6–12)
POTASSIUM SERPL-SCNC: 4.2 MMOL/L (ref 3.5–5.2)
PROT SERPL-MCNC: 7 G/DL (ref 6–8.5)
PROT UR QL STRIP: ABNORMAL
PROTHROMBIN TIME: 13.4 SECONDS (ref 11.8–14.9)
RBC # BLD AUTO: 4.03 10*6/MM3 (ref 3.77–5.28)
RBC # UR STRIP: ABNORMAL /HPF
REF LAB TEST METHOD: ABNORMAL
SODIUM SERPL-SCNC: 139 MMOL/L (ref 136–145)
SP GR UR STRIP: 1.02 (ref 1–1.03)
SQUAMOUS #/AREA URNS HPF: ABNORMAL /HPF
UROBILINOGEN UR QL STRIP: ABNORMAL
WBC # UR STRIP: ABNORMAL /HPF
WBC NRBC COR # BLD AUTO: 6.93 10*3/MM3 (ref 3.4–10.8)
WHOLE BLOOD HOLD COAG: NORMAL
WHOLE BLOOD HOLD SPECIMEN: NORMAL

## 2024-05-29 PROCEDURE — 80053 COMPREHEN METABOLIC PANEL: CPT | Performed by: NURSE PRACTITIONER

## 2024-05-29 PROCEDURE — G0378 HOSPITAL OBSERVATION PER HR: HCPCS

## 2024-05-29 PROCEDURE — 86037 ANCA TITER EACH ANTIBODY: CPT | Performed by: INTERNAL MEDICINE

## 2024-05-29 PROCEDURE — 99223 1ST HOSP IP/OBS HIGH 75: CPT | Performed by: FAMILY MEDICINE

## 2024-05-29 PROCEDURE — 81001 URINALYSIS AUTO W/SCOPE: CPT | Performed by: NURSE PRACTITIONER

## 2024-05-29 PROCEDURE — 86200 CCP ANTIBODY: CPT | Performed by: INTERNAL MEDICINE

## 2024-05-29 PROCEDURE — 83605 ASSAY OF LACTIC ACID: CPT | Performed by: NURSE PRACTITIONER

## 2024-05-29 PROCEDURE — 81374 HLA I TYPING 1 ANTIGEN LR: CPT | Performed by: INTERNAL MEDICINE

## 2024-05-29 PROCEDURE — 83516 IMMUNOASSAY NONANTIBODY: CPT | Performed by: INTERNAL MEDICINE

## 2024-05-29 PROCEDURE — 96375 TX/PRO/DX INJ NEW DRUG ADDON: CPT

## 2024-05-29 PROCEDURE — 96374 THER/PROPH/DIAG INJ IV PUSH: CPT

## 2024-05-29 PROCEDURE — 86038 ANTINUCLEAR ANTIBODIES: CPT | Performed by: INTERNAL MEDICINE

## 2024-05-29 PROCEDURE — 25010000002 KETOROLAC TROMETHAMINE PER 15 MG: Performed by: FAMILY MEDICINE

## 2024-05-29 PROCEDURE — 25810000003 SODIUM CHLORIDE 0.9 % SOLUTION: Performed by: NURSE PRACTITIONER

## 2024-05-29 PROCEDURE — 85025 COMPLETE CBC W/AUTO DIFF WBC: CPT | Performed by: NURSE PRACTITIONER

## 2024-05-29 PROCEDURE — 63710000001 PREDNISONE PER 5 MG: Performed by: INTERNAL MEDICINE

## 2024-05-29 PROCEDURE — 86140 C-REACTIVE PROTEIN: CPT | Performed by: NURSE PRACTITIONER

## 2024-05-29 PROCEDURE — 25010000002 METHYLPREDNISOLONE PER 125 MG: Performed by: NURSE PRACTITIONER

## 2024-05-29 PROCEDURE — 85652 RBC SED RATE AUTOMATED: CPT | Performed by: NURSE PRACTITIONER

## 2024-05-29 PROCEDURE — 86431 RHEUMATOID FACTOR QUANT: CPT | Performed by: INTERNAL MEDICINE

## 2024-05-29 PROCEDURE — G0475 HIV COMBINATION ASSAY: HCPCS | Performed by: INTERNAL MEDICINE

## 2024-05-29 PROCEDURE — 87040 BLOOD CULTURE FOR BACTERIA: CPT | Performed by: NURSE PRACTITIONER

## 2024-05-29 PROCEDURE — 85610 PROTHROMBIN TIME: CPT | Performed by: NURSE PRACTITIONER

## 2024-05-29 PROCEDURE — 80074 ACUTE HEPATITIS PANEL: CPT | Performed by: INTERNAL MEDICINE

## 2024-05-29 PROCEDURE — 36415 COLL VENOUS BLD VENIPUNCTURE: CPT

## 2024-05-29 RX ORDER — ACETAMINOPHEN 325 MG/1
650 TABLET ORAL EVERY 6 HOURS PRN
Status: DISCONTINUED | OUTPATIENT
Start: 2024-05-29 | End: 2024-05-29 | Stop reason: HOSPADM

## 2024-05-29 RX ORDER — ONDANSETRON 2 MG/ML
4 INJECTION INTRAMUSCULAR; INTRAVENOUS EVERY 6 HOURS PRN
Status: DISCONTINUED | OUTPATIENT
Start: 2024-05-29 | End: 2024-05-29 | Stop reason: HOSPADM

## 2024-05-29 RX ORDER — KETOROLAC TROMETHAMINE 15 MG/ML
15 INJECTION, SOLUTION INTRAMUSCULAR; INTRAVENOUS ONCE
Status: COMPLETED | OUTPATIENT
Start: 2024-05-29 | End: 2024-05-29

## 2024-05-29 RX ORDER — ENOXAPARIN SODIUM 100 MG/ML
40 INJECTION SUBCUTANEOUS DAILY
Status: DISCONTINUED | OUTPATIENT
Start: 2024-05-29 | End: 2024-05-29 | Stop reason: DRUGHIGH

## 2024-05-29 RX ORDER — KETOROLAC TROMETHAMINE 15 MG/ML
15 INJECTION, SOLUTION INTRAMUSCULAR; INTRAVENOUS EVERY 6 HOURS PRN
Status: DISCONTINUED | OUTPATIENT
Start: 2024-05-29 | End: 2024-05-29 | Stop reason: HOSPADM

## 2024-05-29 RX ORDER — PREDNISONE 10 MG/1
TABLET ORAL
Qty: 74 TABLET | Refills: 0 | OUTPATIENT
Start: 2024-05-29 | End: 2024-06-10

## 2024-05-29 RX ORDER — POLYETHYLENE GLYCOL 3350 17 G/17G
17 POWDER, FOR SOLUTION ORAL DAILY PRN
Status: DISCONTINUED | OUTPATIENT
Start: 2024-05-29 | End: 2024-05-29 | Stop reason: HOSPADM

## 2024-05-29 RX ORDER — AMOXICILLIN 250 MG
2 CAPSULE ORAL 2 TIMES DAILY PRN
Status: DISCONTINUED | OUTPATIENT
Start: 2024-05-29 | End: 2024-05-29 | Stop reason: HOSPADM

## 2024-05-29 RX ORDER — SODIUM CHLORIDE 9 MG/ML
40 INJECTION, SOLUTION INTRAVENOUS AS NEEDED
Status: DISCONTINUED | OUTPATIENT
Start: 2024-05-29 | End: 2024-05-29 | Stop reason: HOSPADM

## 2024-05-29 RX ORDER — SODIUM CHLORIDE 0.9 % (FLUSH) 0.9 %
10 SYRINGE (ML) INJECTION EVERY 12 HOURS SCHEDULED
Status: DISCONTINUED | OUTPATIENT
Start: 2024-05-29 | End: 2024-05-29 | Stop reason: HOSPADM

## 2024-05-29 RX ORDER — SODIUM CHLORIDE 0.9 % (FLUSH) 0.9 %
10 SYRINGE (ML) INJECTION AS NEEDED
Status: DISCONTINUED | OUTPATIENT
Start: 2024-05-29 | End: 2024-05-29 | Stop reason: HOSPADM

## 2024-05-29 RX ORDER — DOXYCYCLINE 100 MG/1
100 CAPSULE ORAL 2 TIMES DAILY
Status: DISCONTINUED | OUTPATIENT
Start: 2024-05-29 | End: 2024-05-29 | Stop reason: HOSPADM

## 2024-05-29 RX ORDER — BISACODYL 10 MG
10 SUPPOSITORY, RECTAL RECTAL DAILY PRN
Status: DISCONTINUED | OUTPATIENT
Start: 2024-05-29 | End: 2024-05-29 | Stop reason: HOSPADM

## 2024-05-29 RX ORDER — ENOXAPARIN SODIUM 100 MG/ML
30 INJECTION SUBCUTANEOUS DAILY
Status: DISCONTINUED | OUTPATIENT
Start: 2024-05-29 | End: 2024-05-29 | Stop reason: HOSPADM

## 2024-05-29 RX ORDER — BISACODYL 5 MG/1
5 TABLET, DELAYED RELEASE ORAL DAILY PRN
Status: DISCONTINUED | OUTPATIENT
Start: 2024-05-29 | End: 2024-05-29 | Stop reason: HOSPADM

## 2024-05-29 RX ADMIN — DOXYCYCLINE 100 MG: 100 CAPSULE ORAL at 09:32

## 2024-05-29 RX ADMIN — KETOROLAC TROMETHAMINE 15 MG: 15 INJECTION, SOLUTION INTRAMUSCULAR; INTRAVENOUS at 06:35

## 2024-05-29 RX ADMIN — Medication 10 ML: at 09:32

## 2024-05-29 RX ADMIN — METHYLPREDNISOLONE SODIUM SUCCINATE 125 MG: 125 INJECTION INTRAMUSCULAR; INTRAVENOUS at 01:27

## 2024-05-29 RX ADMIN — PREDNISONE 60 MG: 50 TABLET ORAL at 13:15

## 2024-05-29 RX ADMIN — SODIUM CHLORIDE 500 ML: 9 INJECTION, SOLUTION INTRAVENOUS at 01:26

## 2024-05-29 NOTE — PLAN OF CARE
Goal Outcome Evaluation:      Patient A/O x 4. VSS. Medications administered per MAR. No pain or N/V reported on shift. Assessed skin/rash and edema to bilateral lower extremities and left wrist. Patient stated feeling no pain or itching from the edema or rash. Patient verbalized mild discomfort from the edema to her left upper extremity. MD Anali aware. Discharge orders placed by MD Anali. Plan is for patient to take steroid once discharged. If steroid does not clear up rash, patient will follow-up with dermatologist outpatient to biopsy the skin rash. Discharge education and follow-up appointments discussed with patient. Patient verbalized understanding. IV removed per protocol. No other significant changes at this time. Plan of care met.      Progress: no change            Problem: Adult Inpatient Plan of Care  Goal: Plan of Care Review  Outcome: Met  Flowsheets  Taken 5/29/2024 1242 by Palomo Pelletier RN  Progress: no change  Taken 5/29/2024 0624 by Jocelyne Galvan RN  Plan of Care Reviewed With: patient  Goal: Patient-Specific Goal (Individualized)  Outcome: Met  Goal: Absence of Hospital-Acquired Illness or Injury  Outcome: Met  Intervention: Identify and Manage Fall Risk  Recent Flowsheet Documentation  Taken 5/29/2024 1115 by Palomo Pelletier RN  Safety Promotion/Fall Prevention:   safety round/check completed   room organization consistent  Taken 5/29/2024 0915 by Palomo Pelletier RN  Safety Promotion/Fall Prevention:   safety round/check completed   room organization consistent  Taken 5/29/2024 0715 by Palomo Pelletier RN  Safety Promotion/Fall Prevention:   safety round/check completed   room organization consistent  Intervention: Prevent and Manage VTE (Venous Thromboembolism) Risk  Recent Flowsheet Documentation  Taken 5/29/2024 0915 by Palomo Pelletier, RN  Range of Motion: active ROM (range of motion) encouraged  Intervention: Prevent Infection  Recent Flowsheet  Documentation  Taken 5/29/2024 1115 by Palomo Pelletier RN  Infection Prevention:   single patient room provided   rest/sleep promoted   hand hygiene promoted  Taken 5/29/2024 0915 by Palomo Pelletier RN  Infection Prevention:   single patient room provided   rest/sleep promoted   hand hygiene promoted  Taken 5/29/2024 0715 by Palomo Pelletier RN  Infection Prevention:   single patient room provided   rest/sleep promoted   hand hygiene promoted  Goal: Optimal Comfort and Wellbeing  Outcome: Met  Intervention: Provide Person-Centered Care  Recent Flowsheet Documentation  Taken 5/29/2024 0915 by Palomo Pelletier RN  Trust Relationship/Rapport:   care explained   choices provided   emotional support provided   empathic listening provided   questions answered   questions encouraged   reassurance provided   thoughts/feelings acknowledged  Goal: Readiness for Transition of Care  Outcome: Met     Problem: Skin or Soft Tissue Infection  Goal: Absence of Infection Signs and Symptoms  Outcome: Met  Intervention: Minimize and Manage Infection Progression  Recent Flowsheet Documentation  Taken 5/29/2024 1115 by Palomo Pelletier RN  Infection Prevention:   single patient room provided   rest/sleep promoted   hand hygiene promoted  Taken 5/29/2024 0915 by Palomo Pelletier RN  Infection Prevention:   single patient room provided   rest/sleep promoted   hand hygiene promoted  Taken 5/29/2024 0715 by Palomo Pelletier RN  Infection Prevention:   single patient room provided   rest/sleep promoted   hand hygiene promoted

## 2024-05-29 NOTE — PROGRESS NOTES
Admitted earlier in the day by my colleague for rash and polyarticular joint pains. Had tick workup earlier this week with negative lym, rickettsia and ehrlichia pcrs. Was given empiric doxycycline without improvement. Rash appears vasculitic although esr and crp essentially normal. Will check HIV, Hep panel, GC/Chlamydia, LUCAS, ANCA, RF, anti-ccp, HLA-B27. Will need to determine if derm will come here to do biopsy of one of the lesions.

## 2024-05-29 NOTE — ED PROVIDER NOTES
Time: 8:42 PM EDT  Date of encounter:  5/28/2024  Independent Historian/Clinical History and Information was obtained by:   Patient    History is limited by: N/A    Chief Complaint   Patient presents with    Insect Bite         History of Present Illness:  The patient is a 63 y.o. year old female who presents to the emergency department for evaluation of left wrist redness, pain and swelling with no known injury.  The pt also reports that the insect bites that she has recently been seen for are not improving with the medication.  She denies any shortness of air but states that she has been having chills.  She denies any nausea, vomiting, or diarrhea.  She states that the areas of erythema are now all the way up to her thighs.  She states that she is having redness to her left wrist and right elbow pain tenderness and warmth.  She has bilateral pain redness and warmth to both ankles medial and lateral.  She denies any weeping from the wounds.  She states that her both ankles are tender and painful.  She reports her left wrist is tender and painful and also her right elbow is becoming tender and painful as well.  There are no areas of rash or lesions to either arms.      Patient Care Team  Primary Care Provider: Provider, No Known    Past Medical History:     No Known Allergies  History reviewed. No pertinent past medical history.  History reviewed. No pertinent surgical history.  History reviewed. No pertinent family history.    Home Medications:  Prior to Admission medications    Medication Sig Start Date End Date Taking? Authorizing Provider   clobetasol propionate (TEMOVATE) 0.05 % cream Apply 1 Application topically to the appropriate area as directed 2 (Two) Times a Day for 7 days. 5/24/24 5/31/24  Eddie Stauffer PA-C   doxycycline (MONODOX) 100 MG capsule Take 1 capsule by mouth 2 (Two) Times a Day for 10 days. 5/20/24 5/30/24  Andre Griffin PA-C   famotidine (PEPCID) 20 MG tablet Take 1 tablet by  XR notified    "mouth 2 (Two) Times a Day. 5/20/24   Andre Griffin PA-C        Social History:   Social History     Tobacco Use    Smoking status: Never    Smokeless tobacco: Never   Substance Use Topics    Alcohol use: Yes     Comment: occasional wine    Drug use: Never         Review of Systems:  Review of Systems   Constitutional:  Negative for chills and fever.   HENT:  Negative for congestion, ear pain and sore throat.    Eyes:  Negative for pain.   Respiratory:  Negative for cough, chest tightness and shortness of breath.    Cardiovascular:  Negative for chest pain.   Gastrointestinal:  Negative for abdominal pain, diarrhea, nausea and vomiting.   Genitourinary:  Negative for flank pain and hematuria.   Musculoskeletal:  Positive for arthralgias and joint swelling.   Skin:  Positive for color change and rash. Negative for pallor.   Neurological:  Negative for seizures and headaches.   All other systems reviewed and are negative.       Physical Exam:  /68 (BP Location: Right arm, Patient Position: Sitting)   Pulse 67   Temp 98.2 °F (36.8 °C) (Oral)   Resp 18   Ht 157.5 cm (62.01\")   Wt 45.2 kg (99 lb 10.4 oz)   SpO2 96%   BMI 18.22 kg/m²         Physical Exam  Vitals and nursing note reviewed.   Constitutional:       General: She is not in acute distress.     Appearance: Normal appearance. She is not ill-appearing or toxic-appearing.   HENT:      Head: Normocephalic and atraumatic.   Eyes:      General: No scleral icterus.     Conjunctiva/sclera: Conjunctivae normal.      Pupils: Pupils are equal, round, and reactive to light.   Cardiovascular:      Rate and Rhythm: Normal rate and regular rhythm.      Pulses: Normal pulses.   Pulmonary:      Effort: Pulmonary effort is normal. No respiratory distress.   Abdominal:      General: Abdomen is flat. There is no distension.   Musculoskeletal:         General: Swelling (left wrist) and tenderness (left wrist) present. No signs of injury. Normal range of motion. "      Cervical back: Normal range of motion and neck supple. No rigidity or tenderness.   Lymphadenopathy:      Cervical: No cervical adenopathy.   Skin:     General: Skin is warm and dry.      Capillary Refill: Capillary refill takes less than 2 seconds.      Findings: Erythema and rash present.   Neurological:      General: No focal deficit present.      Mental Status: She is alert and oriented to person, place, and time. Mental status is at baseline.                                              Procedures:  Procedures      Medical Decision Making:      Comorbidities that affect care:    None    External Notes reviewed:    Previous ED Note: Previous last 2 ED visits for the same complaint.      The following orders were placed and all results were independently analyzed by me:  Orders Placed This Encounter   Procedures    Blood Culture - Blood,    Blood Culture - Blood,    XR Wrist 3+ View Left    XR Elbow 3+ View Right    [place] Draw    Comprehensive Metabolic Panel    Protime-INR    Urinalysis With Microscopic If Indicated (No Culture) - Urine, Clean Catch    C-reactive Protein    Sedimentation Rate    CBC Auto Differential    Lactic Acid, Plasma    Urinalysis, Microscopic Only - Urine, Clean Catch    Diet: Regular/House; Fluid Consistency: Thin (IDDSI 0)    Vital Signs    Intake & Output    Weigh Patient    Oral Care    Saline Lock & Maintain IV Access    Activity - Ad Lizeth    Code Status and Medical Interventions:    Inpatient Hospitalist Consult    Insert Peripheral IV    Initiate Observation Status    CBC & Differential    Green Top (Gel)    Lavender Top    Gold Top - SST    Light Blue Top       Medications Given in the Emergency Department:  Medications   sodium chloride 0.9 % flush 10 mL (has no administration in time range)   sodium chloride 0.9 % flush 10 mL (has no administration in time range)   sodium chloride 0.9 % infusion 40 mL (has no administration in time range)   sennosides-docusate (PERICOLACE)  8.6-50 MG per tablet 2 tablet (has no administration in time range)     And   polyethylene glycol (MIRALAX) packet 17 g (has no administration in time range)     And   bisacodyl (DULCOLAX) EC tablet 5 mg (has no administration in time range)     And   bisacodyl (DULCOLAX) suppository 10 mg (has no administration in time range)   ondansetron (ZOFRAN) injection 4 mg (has no administration in time range)   doxycycline (MONODOX) capsule 100 mg (has no administration in time range)   Enoxaparin Sodium (LOVENOX) syringe 30 mg (has no administration in time range)   ketorolac (TORADOL) injection 15 mg (has no administration in time range)   ketorolac (TORADOL) injection 15 mg (has no administration in time range)   acetaminophen (TYLENOL) tablet 650 mg (has no administration in time range)   sodium chloride 0.9 % bolus 500 mL (0 mL Intravenous Stopped 5/29/24 0159)   methylPREDNISolone sodium succinate (SOLU-Medrol) 125 mg in sterile water (preservative free) 2 mL (125 mg Intravenous Given 5/29/24 0127)        ED Course:    The patient was initially evaluated in the triage area where orders were placed. The patient was later dispositioned by KACI Reynoso.      The patient was advised to stay for completion of workup which includes but is not limited to communication of labs and radiological results, reassessment and plan. The patient was advised that leaving prior to disposition by a provider could result in critical findings that are not communicated to the patient.     ED Course as of 05/29/24 0624   Tue May 28, 2024   2043   --- PROVIDER IN TRIAGE NOTE ---    Patient was seen and evaluated in triage by KACI Nayak.  Orders were written and the patient is currently awaiting disposition.   [MS]   Wed May 29, 2024   0317 I reviewed this case with Dr. Kiran Molina and she feels the patient would benefit from admission and to be worked up for a autoimmune disorder since her symptoms are significantly  worsening over the last week and a half.  I spoke with Dr. Anderson and reviewed the case with him.  He will admit the patient to the hospitalist service.  The patient was made aware of the admission. [TC]      ED Course User Index  [MS] Shaila Wang KACI Booth  [TC] Kerry Jennings APRN       Labs:    Lab Results (last 24 hours)       Procedure Component Value Units Date/Time    CBC & Differential [[id number]]  (Abnormal) Collected: 05/29/24 0056    Specimen: Blood Updated: 05/29/24 0104    Narrative:      The following orders were created for panel order CBC & Differential.  Procedure                               Abnormality         Status                     ---------                               -----------         ------                     CBC Auto Differential[[id number]]        Abnormal            Final result                 Please view results for these tests on the individual orders.    Comprehensive Metabolic Panel [[id number]]  (Abnormal) Collected: 05/29/24 0056    Specimen: Blood Updated: 05/29/24 0123     Glucose 106 mg/dL      BUN 16 mg/dL      Creatinine 0.75 mg/dL      Sodium 139 mmol/L      Potassium 4.2 mmol/L      Chloride 103 mmol/L      CO2 25.6 mmol/L      Calcium 8.8 mg/dL      Total Protein 7.0 g/dL      Albumin 3.9 g/dL      ALT (SGPT) 8 U/L      AST (SGOT) 16 U/L      Alkaline Phosphatase 80 U/L      Total Bilirubin 0.3 mg/dL      Globulin 3.1 gm/dL      A/G Ratio 1.3 g/dL      BUN/Creatinine Ratio 21.3     Anion Gap 10.4 mmol/L      eGFR 90.1 mL/min/1.73     Narrative:      GFR Normal >60  Chronic Kidney Disease <60  Kidney Failure <15      Protime-INR [[id number]]  (Normal) Collected: 05/29/24 0056    Specimen: Blood Updated: 05/29/24 0113     Protime 13.4 Seconds      INR 1.00    Narrative:      Suggested Therapeutic Ranges For Oral Anticoagulant Therapy:  Level of Therapy                      INR Target Range  Standard Dose                            2.0-3.0  High Dose                                 2.5-3.5  Patients not receiving anticoagulant  Therapy Normal Range                     0.86-1.15    C-reactive Protein [[id number]]  (Normal) Collected: 05/29/24 0056    Specimen: Blood Updated: 05/29/24 0120     C-Reactive Protein 0.32 mg/dL     Sedimentation Rate [[id number]]  (Abnormal) Collected: 05/29/24 0056    Specimen: Blood Updated: 05/29/24 0109     Sed Rate 31 mm/hr     CBC Auto Differential [[id number]]  (Abnormal) Collected: 05/29/24 0056    Specimen: Blood Updated: 05/29/24 0104     WBC 6.93 10*3/mm3      RBC 4.03 10*6/mm3      Hemoglobin 11.8 g/dL      Hematocrit 35.6 %      MCV 88.3 fL      MCH 29.3 pg      MCHC 33.1 g/dL      RDW 11.9 %      RDW-SD 38.7 fl      MPV 9.3 fL      Platelets 290 10*3/mm3      Neutrophil % 57.9 %      Lymphocyte % 29.4 %      Monocyte % 9.5 %      Eosinophil % 2.5 %      Basophil % 0.6 %      Immature Grans % 0.1 %      Neutrophils, Absolute 4.01 10*3/mm3      Lymphocytes, Absolute 2.04 10*3/mm3      Monocytes, Absolute 0.66 10*3/mm3      Eosinophils, Absolute 0.17 10*3/mm3      Basophils, Absolute 0.04 10*3/mm3      Immature Grans, Absolute 0.01 10*3/mm3      nRBC 0.0 /100 WBC     Blood Culture - Blood, Arm, Left [[id number]] Collected: 05/29/24 0056    Specimen: Blood from Arm, Left Updated: 05/29/24 0100    Blood Culture - Blood, Arm, Right [[id number]] Collected: 05/29/24 0056    Specimen: Blood from Arm, Right Updated: 05/29/24 0100    Lactic Acid, Plasma [[id number]]  (Normal) Collected: 05/29/24 0056    Specimen: Blood Updated: 05/29/24 0118     Lactate 0.9 mmol/L     Urinalysis With Microscopic If Indicated (No Culture) - Urine, Clean Catch [[id number]]  (Abnormal) Collected: 05/29/24 0235    Specimen: Urine, Clean Catch Updated: 05/29/24 0250     Color, UA Yellow     Appearance, UA Clear     pH, UA 7.5     Specific Gravity, UA 1.016     Glucose, UA Negative     Ketones, UA Negative     Bilirubin, UA Negative     Blood, UA Negative     Protein,   mg/dL (2+)     Leuk Esterase, UA Moderate (2+)     Nitrite, UA Negative     Urobilinogen, UA 1.0 E.U./dL    Urinalysis, Microscopic Only - Urine, Clean Catch [[id number]]  (Abnormal) Collected: 05/29/24 0235    Specimen: Urine, Clean Catch Updated: 05/29/24 0250     RBC, UA 6-10 /HPF      WBC, UA 11-20 /HPF      Bacteria, UA None Seen /HPF      Squamous Epithelial Cells, UA 0-2 /HPF      Hyaline Casts, UA 3-6 /LPF      Methodology Automated Microscopy             Imaging:    XR Elbow 3+ View Right    Result Date: 5/28/2024  XR ELBOW 3+ VW RIGHT-: 5/28/2024 8:51 PM  INDICATION: Acute right elbow pain.  COMPARISON: None available.  FINDINGS: 3 views of the right elbow.  No fracture, dislocation, or effusion. No bone erosion or destruction.  No foreign body.      Negative right elbow.  Electronically Signed By-Dr. Errol Garcia MD On:5/28/2024 9:47 PM      XR Wrist 3+ View Left    Result Date: 5/28/2024  XR WRIST 3+ VW LEFT-: 5/28/2024 8:51 PM  INDICATION: Acute left wrist pain.  COMPARISON: None available.  FINDINGS: 3 views of the left wrist.  No fracture or dislocation. No bone erosion or destruction.  No foreign body.      Negative left wrist.  Electronically Signed By-Dr. Errol Garcia MD On:5/28/2024 9:47 PM         Differential Diagnosis and Discussion:      Joint Pain: Differential diagnosis includes but is not limited to polyarticular arthritis, gout, tendinitis, hemarthrosis, septic arthritis, rheumatoid arthritis, bursitis, degenerative joint disease, joint effusion, autoimmune disorder, trauma, and occult neoplasm.  Rash: Differential diagnosis includes but is not limited to sepsis, cellulitis, [place] Spotted Fever, meningitis, meningococcemia, Varicella, Strep infection, dermatitis, allergic reaction, Lyme disease, and toxic shock syndrome.    All labs were reviewed and interpreted by me.    MDM     Amount and/or Complexity of Data Reviewed  Clinical lab tests: reviewed  Tests in the radiology  section of CPT®: reviewed  Decide to obtain previous medical records or to obtain history from someone other than the patient: yes         Patient Care Considerations:    SEPSIS was considered but is NOT present in the emergency department as SIRS criteria is not present.      Consultants/Shared Management Plan:    Hospitalist: I have discussed the case with Dr. Power who agrees to accept the patient for admission.    Social Determinants of Health:    Patient is independent, reliable, and has access to care.       Disposition and Care Coordination:    Admit:   Through independent evaluation of the patient's history, physical, and imperical data, the patient meets criteria for inpatient admission to the hospital.      Final diagnoses:   Rash in adult   Bilateral ankle joint pain   Painful swelling of joint        ED Disposition       ED Disposition   Decision to Admit    Condition   --    Comment   Level of Care: Remote Telemetry [26]   Diagnosis: Acute bilateral ankle pain [[id number]]   Admitting Physician: ARPIT POWER [[id number]]                 This medical record created using voice recognition software.             Kerry Jennings, KACI  05/29/24 0625

## 2024-05-29 NOTE — H&P
[facility]   HISTORY AND PHYSICAL    Patient Name: Stevenson Zuleta  : 1960  MRN: [id number]  Primary Care Physician:  Provider, No Known  Date of admission: 2024    Subjective   Subjective     Chief Complaint: Rash, polyarthritis    HPI:    Stevenson Zuleta is a 63 y.o. female with no significant past medical history presented to the ED with complaints of an ascending rash with polyarthritis.  He states that 2 weeks ago she began to have a patchy rash by her ankles that gradually spread upwards into her inner thigh and gluteal folds.  While there was spreading patient began to initially have right ankle pain and left ankle pain then left wrist and right wrist pain.  The pain continued to worsen as this rash progressed.  He has been seen at this facility on 2 prior occasions which she has been worked up I am disease which was negative and other inflammatory disorders.  Since the rash and pain continued to worsen she came back to the ED for further evaluation.  In the ED patient's vitals were all within normal limits on arrival.  Labs were relatively unremarkable including a normal CRP level and borderline sed rate.  Patient WBC and lactic acid both within normal limits.  Imaging of the affected joints were unremarkable.  When she denied any recent fevers, chills, headaches, focal weakness, chest pain, palpitation, shortness of breath, cough, abdominal pain, nausea, vomiting, diarrhea, constipation, dysuria, hematuria, hematochezia, melena, or anxiety.     Review of Systems   All systems were reviewed and negative except for: As per HPI    Personal History     History reviewed. No pertinent past medical history.    History reviewed. No pertinent surgical history.    Family History: family history is not on file. Otherwise pertinent FHx was reviewed and not pertinent to current issue.    Social History:  reports that she has never smoked. She has never used smokeless tobacco. She reports current alcohol use. She  reports that she does not use drugs.    Home Medications:  clobetasol propionate, doxycycline, and famotidine      Allergies:  No Known Allergies    Objective   Objective     Vitals:   Temp:  [97.9 °F (36.6 °C)-99.5 °F (37.5 °C)] 98.2 °F (36.8 °C)  Heart Rate:  [66-70] 67  Resp:  [16-20] 18  BP: (116-158)/(54-79) 122/68  Physical Exam    Constitutional: Awake, alert   Eyes: PERRLA, sclerae anicteric, no conjunctival injection   HENT: NCAT, mucous membranes moist   Neck: Supple, no thyromegaly, no lymphadenopathy, trachea midline   Respiratory: Clear to auscultation bilaterally, nonlabored respirations    Cardiovascular: RRR, no murmurs, rubs, or gallops, palpable pedal pulses bilaterally   Gastrointestinal: Positive bowel sounds, soft, nontender, nondistended   Musculoskeletal: Ankle tenderness and warmth, bilateral wrist tenderness and warmth, no clubbing or cyanosis to extremities   Psychiatric: Appropriate affect, cooperative   Neurologic: Oriented x 3, strength symmetric in all extremities, Cranial Nerves grossly intact to confrontation, speech clear   Skin: Ascending macular rash with ankle erythema, edema and tenderness.  Left wrist erythema edema and tenderness.    Result Review    Result Review:  I have personally reviewed the results from the time of this admission to 5/29/2024 06:17 EDT and agree with these findings:  [x]  Laboratory list / accordion  []  Microbiology  [x]  Radiology  [x]  EKG/Telemetry   []  Cardiology/Vascular   []  Pathology  []  Old records  []  Other:  Most notable findings include: Normal CRP, borderline sed rate, normal WBC, x-rays negative      Assessment & Plan   Assessment / Plan     Brief Patient Summary:  Stevenson Zuleta is a 63 y.o. female with no significant past medical history presented to the ED with complaints of an ascending rash with polyarthritis    Active Hospital Problems:  Active Hospital Problems    Diagnosis     **Polyarthritis     Acute bilateral ankle pain     Rash in  adult     Painful swelling of joint      Plan:       Ascending rash with polyarthritis  -Admit to medical floor  -Patient symptomatic for 2 weeks  -TICK workup from prior visit negative  -CRP within normal limits, sed rate borderline  -Afebrile  -Macular nonblanching rash  -Right ankle, left wrist, right wrist, tenderness, erythema, and edema.  Pain with movement and weightbearing  -NSAIDs as needed  -Steroids  -Rheumatology consult if warranted  -Further workup per clinical course      GI ppx  DVT ppx        CODE STATUS:    Level Of Support Discussed With: Patient  Code Status (Patient has no pulse and is not breathing): CPR (Attempt to Resuscitate)  Medical Interventions (Patient has pulse or is breathing): Full Support    Admission Status:  I believe this patient meets observation status.      Electronically signed by Jaiden Anderson MD, 05/29/24, 6:17 AM EDT.

## 2024-05-29 NOTE — OUTREACH NOTE
Prep Survey      Flowsheet Row Responses   [facility] facility patient discharged from? [facility]   Is LACE score < 7 ? Yes   Eligibility Not Eligible   What are the reasons patient is not eligible? Other  [low risk for readmit]   Does the patient have one of the following disease processes/diagnoses(primary or secondary)? Other   Prep survey completed? Yes            Catalina LONDONO - Registered Nurse

## 2024-05-29 NOTE — PLAN OF CARE
Goal Outcome Evaluation:  Plan of Care Reviewed With: patient        Progress: no change  Outcome Evaluation: Patient arrived to [facility] from ED, admitted for third ED visit for rash to bilateral legs with swelling in ankles, wrists, and right elbow. Patient VSS, alert and oriented x4, and up ad joshua. Patient has pain in joints when ambulating. Adminstered scheduled meds per MAR. Applied ice pack to swollen left wrist. Patient states no needs at this time. Plan of care ongoing.

## 2024-05-29 NOTE — DISCHARGE SUMMARY
Physician Discharge Summary  Patient Identification  PATIENT IDENTIFICATION    Name: Stevenson Zuleta  :  1960  MRN: [id number]    Admit date: 2024    Discharge date: 2024     Admitting Physician: Jaiden Anderson MD     Discharge Physician: Cole Briones MD     Admission Diagnoses: Painful swelling of joint [M25.40]  Bilateral ankle joint pain [M25.571, M25.572]  Acute bilateral ankle pain [M25.571, M25.572]  Rash in adult [R21]    Hospital Problems:   Principal Problem:  Polyarthritis   Active Problems:  Problems Addressed this Visit          Musculoskeletal and Injuries    Painful swelling of joint       Skin    Rash in adult - Primary     Other Visit Diagnoses       Bilateral ankle joint pain              Diagnoses         Codes Comments    Rash in adult    -  Primary ICD-10-CM: R21  ICD-9-CM: 782.1     Bilateral ankle joint pain     ICD-10-CM: M25.571, M25.572  ICD-9-CM: 719.47     Painful swelling of joint     ICD-10-CM: M25.40  ICD-9-CM: 719.00 BILATERAL ANKLES, LEFT WRIST, RIGHT ELBOW             Discharged Condition: stable    Consults: IP CONSULT TO HOSPITALIST    Imaging:   Imaging Results (Last 24 Hours)       Procedure Component Value Units Date/Time    XR Elbow 3+ View Right [[id number]] Collected: 24     Updated: 24    Narrative:      XR ELBOW 3+ VW RIGHT-: 2024 8:51 PM     INDICATION:  Acute right elbow pain.     COMPARISON:  None available.     FINDINGS:  3 views of the right elbow.  No fracture, dislocation, or effusion. No  bone erosion or destruction.  No foreign body.       Impression:      Negative right elbow.     Electronically Signed By-Dr. Errol Garcia MD On:2024 9:47 PM       XR Wrist 3+ View Left [[id number]] Collected: 24     Updated: 24    Narrative:      XR WRIST 3+ VW LEFT-: 2024 8:51 PM     INDICATION:  Acute left wrist pain.     COMPARISON:  None available.     FINDINGS:  3 views of the left wrist.  No  fracture or dislocation. No bone erosion  or destruction.  No foreign body.       Impression:      Negative left wrist.     Electronically Signed By-Dr. Errol Garcia MD On:5/28/2024 9:47 PM               Labs:   Lab Results (last 24 hours)       Procedure Component Value Units Date/Time    HIV-1 & HIV-2 Antibodies [[id number]]  (Normal) Collected: 05/29/24 1031    Specimen: Blood from Arm, Right Updated: 05/29/24 1123    Narrative:      The following orders were created for panel order HIV-1 & HIV-2 Antibodies.  Procedure                               Abnormality         Status                     ---------                               -----------         ------                     HIV-1 / O / 2 Ag / Antibody[[id number]]  Normal              Final result                 Please view results for these tests on the individual orders.    HIV-1 / O / 2 Ag / Antibody [[id number]]  (Normal) Collected: 05/29/24 1031    Specimen: Blood from Arm, Right Updated: 05/29/24 1123     HIV-1/ HIV-2 Ab Non-Reactive     HIV-1 P24 Ag Screen Non-Reactive    Narrative:      Detection of p24 may be inhibited by biotin in the sample, causing false negative results in acute infection. Therefore, do not test samples from patients who are taking biotin    LUCAS [[id number]] Collected: 05/29/24 1031    Specimen: Blood from Arm, Right Updated: 05/29/24 1046    Rheumatoid Factor [[id number]] Collected: 05/29/24 1031    Specimen: Blood from Arm, Right Updated: 05/29/24 1046    Cyclic Citrul Peptide Antibody, IgG / IgA [[id number]] Collected: 05/29/24 1031    Specimen: Blood from Arm, Right Updated: 05/29/24 1046    Hepatitis Panel, Acute [[id number]] Collected: 05/29/24 1031    Specimen: Blood from Arm, Right Updated: 05/29/24 1046    ANCA Panel [[id number]] Collected: 05/29/24 1031    Specimen: Blood from Arm, Right Updated: 05/29/24 1046    HLA-B27 Antigen [[id number]] Collected: 05/29/24 1031    Specimen: Blood from Arm, Right Updated: 05/29/24 1046     Urinalysis With Microscopic If Indicated (No Culture) - Urine, Clean Catch [[id number]]  (Abnormal) Collected: 05/29/24 0235    Specimen: Urine, Clean Catch Updated: 05/29/24 0250     Color, UA Yellow     Appearance, UA Clear     pH, UA 7.5     Specific Gravity, UA 1.016     Glucose, UA Negative     Ketones, UA Negative     Bilirubin, UA Negative     Blood, UA Negative     Protein,  mg/dL (2+)     Leuk Esterase, UA Moderate (2+)     Nitrite, UA Negative     Urobilinogen, UA 1.0 E.U./dL    Urinalysis, Microscopic Only - Urine, Clean Catch [[id number]]  (Abnormal) Collected: 05/29/24 0235    Specimen: Urine, Clean Catch Updated: 05/29/24 0250     RBC, UA 6-10 /HPF      WBC, UA 11-20 /HPF      Bacteria, UA None Seen /HPF      Squamous Epithelial Cells, UA 0-2 /HPF      Hyaline Casts, UA 3-6 /LPF      Methodology Automated Microscopy    Comprehensive Metabolic Panel [[id number]]  (Abnormal) Collected: 05/29/24 0056    Specimen: Blood Updated: 05/29/24 0123     Glucose 106 mg/dL      BUN 16 mg/dL      Creatinine 0.75 mg/dL      Sodium 139 mmol/L      Potassium 4.2 mmol/L      Chloride 103 mmol/L      CO2 25.6 mmol/L      Calcium 8.8 mg/dL      Total Protein 7.0 g/dL      Albumin 3.9 g/dL      ALT (SGPT) 8 U/L      AST (SGOT) 16 U/L      Alkaline Phosphatase 80 U/L      Total Bilirubin 0.3 mg/dL      Globulin 3.1 gm/dL      A/G Ratio 1.3 g/dL      BUN/Creatinine Ratio 21.3     Anion Gap 10.4 mmol/L      eGFR 90.1 mL/min/1.73     Narrative:      GFR Normal >60  Chronic Kidney Disease <60  Kidney Failure <15      C-reactive Protein [[id number]]  (Normal) Collected: 05/29/24 0056    Specimen: Blood Updated: 05/29/24 0120     C-Reactive Protein 0.32 mg/dL     Lactic Acid, Plasma [[id number]]  (Normal) Collected: 05/29/24 0056    Specimen: Blood Updated: 05/29/24 0118     Lactate 0.9 mmol/L     Protime-INR [[id number]]  (Normal) Collected: 05/29/24 0056    Specimen: Blood Updated: 05/29/24 0113     Protime 13.4 Seconds       INR 1.00    Narrative:      Suggested Therapeutic Ranges For Oral Anticoagulant Therapy:  Level of Therapy                      INR Target Range  Standard Dose                            2.0-3.0  High Dose                                2.5-3.5  Patients not receiving anticoagulant  Therapy Normal Range                     0.86-1.15    Sedimentation Rate [[id number]]  (Abnormal) Collected: 05/29/24 0056    Specimen: Blood Updated: 05/29/24 0109     Sed Rate 31 mm/hr     CBC & Differential [[id number]]  (Abnormal) Collected: 05/29/24 0056    Specimen: Blood Updated: 05/29/24 0104    Narrative:      The following orders were created for panel order CBC & Differential.  Procedure                               Abnormality         Status                     ---------                               -----------         ------                     CBC Auto Differential[[id number]]        Abnormal            Final result                 Please view results for these tests on the individual orders.    CBC Auto Differential [[id number]]  (Abnormal) Collected: 05/29/24 0056    Specimen: Blood Updated: 05/29/24 0104     WBC 6.93 10*3/mm3      RBC 4.03 10*6/mm3      Hemoglobin 11.8 g/dL      Hematocrit 35.6 %      MCV 88.3 fL      MCH 29.3 pg      MCHC 33.1 g/dL      RDW 11.9 %      RDW-SD 38.7 fl      MPV 9.3 fL      Platelets 290 10*3/mm3      Neutrophil % 57.9 %      Lymphocyte % 29.4 %      Monocyte % 9.5 %      Eosinophil % 2.5 %      Basophil % 0.6 %      Immature Grans % 0.1 %      Neutrophils, Absolute 4.01 10*3/mm3      Lymphocytes, Absolute 2.04 10*3/mm3      Monocytes, Absolute 0.66 10*3/mm3      Eosinophils, Absolute 0.17 10*3/mm3      Basophils, Absolute 0.04 10*3/mm3      Immature Grans, Absolute 0.01 10*3/mm3      nRBC 0.0 /100 WBC     Blood Culture - Blood, Arm, Left [[id number]] Collected: 05/29/24 0056    Specimen: Blood from Arm, Left Updated: 05/29/24 0100    [facility] Draw [[id number]] Collected: 05/29/24 0056     Specimen: Blood Updated: 05/29/24 0100    Narrative:      The following orders were created for panel order [facility] Draw.  Procedure                               Abnormality         Status                     ---------                               -----------         ------                     Green Top (Gel)[[id number]]                                  Final result               Lavender Top[[id number]]                                     Final result               Gold Top - SST[[id number]]                                   Final result               Light Blue Top[[id number]]                                   Final result                 Please view results for these tests on the individual orders.    Light Blue Top [[id number]] Collected: 05/29/24 0056    Specimen: Blood Updated: 05/29/24 0100     Extra Tube Hold for add-ons.     Comment: Auto resulted       Green Top (Gel) [[id number]] Collected: 05/29/24 0056    Specimen: Blood Updated: 05/29/24 0100     Extra Tube Hold for add-ons.     Comment: Auto resulted.       Lavender Top [[id number]] Collected: 05/29/24 0056    Specimen: Blood Updated: 05/29/24 0100     Extra Tube hold for add-on     Comment: Auto resulted       Gold Top - SST [[id number]] Collected: 05/29/24 0056    Specimen: Blood Updated: 05/29/24 0100     Extra Tube Hold for add-ons.     Comment: Auto resulted.       Blood Culture - Blood, Arm, Right [[id number]] Collected: 05/29/24 0056    Specimen: Blood from Arm, Right Updated: 05/29/24 0100              Hospital Course:   63 y.o. female with no significant past medical history presented to the ED with complaints of an ascending rash with polyarthritis. 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 Had been placed on empiric doxycycline without improvement prior to admission. Tick panel negative. Labs pending at d/c were HIV, Hep panel, LUCAS, ANCA, HLA-B27.  Discussed félix Vazquez of Derm and he reviewed images.  Recommended steroids and if not improving with this, or rash recurs, he would see her in clinic and get biopsies at that time. She is otherwise stable for d/c.     Discharge Exam:  Gen: up in bed, in NAD  CV:  RRR, no m/r/g, no peripheral edema  Resp: CTAB, no increase work of breathing  Abd: soft, NT, ND, bs present  Neuro: moves all 4 ext, following commands  Ext: no clubbing, cyanosis or edema  Skin: vasculitic rash on legs  Psych: AAOx3, pleasant affect    Disposition: Home    Patient Discharge Medications:      Discharge Medications        New Medications        Instructions Start Date   predniSONE 10 MG tablet  Commonly known as: DELTASONE   Take 6 tabs daily 5/30-6/2, then 4 tabs daily 6/3-6/7 then 3 tabs daily 6/8-6/12, then 2 tabs daily 6/13-6/17, then 1 tab daily 6/18-6/22             Continue These Medications        Instructions Start Date   clobetasol propionate 0.05 % cream  Commonly known as: TEMOVATE   1 Application, Topical, 2 Times Daily      doxycycline 100 MG capsule  Commonly known as: MONODOX   100 mg, Oral, 2 Times Daily      famotidine 20 MG tablet  Commonly known as: PEPCID   20 mg, Oral, 2 Times Daily      VITAMIN D PO   1 tablet, Oral, Daily              Follow-up Information       Provider, No Known .    Contact information:  [facility] [address]                                 Signed:  Scout Briones MD  [facility]ospitalist [facility]  5/29/2024  11:37 EDT      I spent 23 minutes arranging and coordinating discharge and reviewing medications with majority of time spent counseling patient

## 2024-05-30 LAB
ANA SER QL: NEGATIVE
B BURGDOR DNA SPEC QL NAA+PROBE: NEGATIVE
CCP IGA+IGG SERPL IA-ACNC: 4 UNITS (ref 0–19)

## 2024-05-31 LAB
C-ANCA TITR SER IF: NORMAL TITER
MYELOPEROXIDASE AB SER IA-ACNC: <0.2 UNITS (ref 0–0.9)
P-ANCA ATYPICAL TITR SER IF: NORMAL TITER
P-ANCA TITR SER IF: NORMAL TITER
PROTEINASE3 AB SER IA-ACNC: <0.2 UNITS (ref 0–0.9)

## 2024-06-03 LAB
BACTERIA SPEC AEROBE CULT: NORMAL
BACTERIA SPEC AEROBE CULT: NORMAL

## 2024-06-05 LAB — HLA-B27 QL NAA+PROBE: NEGATIVE
